# Patient Record
Sex: FEMALE | Race: WHITE | NOT HISPANIC OR LATINO | Employment: FULL TIME | URBAN - METROPOLITAN AREA
[De-identification: names, ages, dates, MRNs, and addresses within clinical notes are randomized per-mention and may not be internally consistent; named-entity substitution may affect disease eponyms.]

---

## 2022-01-08 ENCOUNTER — APPOINTMENT (OUTPATIENT)
Dept: CT IMAGING | Facility: CLINIC | Age: 52
End: 2022-01-08
Attending: EMERGENCY MEDICINE
Payer: COMMERCIAL

## 2022-01-08 ENCOUNTER — HOSPITAL ENCOUNTER (EMERGENCY)
Facility: CLINIC | Age: 52
Discharge: HOME | End: 2022-01-08
Attending: EMERGENCY MEDICINE | Admitting: EMERGENCY MEDICINE
Payer: COMMERCIAL

## 2022-01-08 ENCOUNTER — APPOINTMENT (OUTPATIENT)
Dept: GENERAL RADIOLOGY | Facility: CLINIC | Age: 52
End: 2022-01-08
Attending: EMERGENCY MEDICINE
Payer: COMMERCIAL

## 2022-01-08 VITALS
RESPIRATION RATE: 20 BRPM | TEMPERATURE: 98 F | DIASTOLIC BLOOD PRESSURE: 77 MMHG | HEART RATE: 81 BPM | OXYGEN SATURATION: 96 % | SYSTOLIC BLOOD PRESSURE: 134 MMHG

## 2022-01-08 DIAGNOSIS — S00.83XA TRAUMATIC HEMATOMA OF FOREHEAD, INITIAL ENCOUNTER: ICD-10-CM

## 2022-01-08 DIAGNOSIS — S40.011A CONTUSION OF RIGHT SHOULDER, INITIAL ENCOUNTER: ICD-10-CM

## 2022-01-08 DIAGNOSIS — S13.9XXA NECK SPRAIN, INITIAL ENCOUNTER: ICD-10-CM

## 2022-01-08 PROCEDURE — 73030 X-RAY EXAM OF SHOULDER: CPT | Mod: RT

## 2022-01-08 PROCEDURE — 96374 THER/PROPH/DIAG INJ IV PUSH: CPT

## 2022-01-08 PROCEDURE — 250N000011 HC RX IP 250 OP 636: Performed by: EMERGENCY MEDICINE

## 2022-01-08 PROCEDURE — 99285 EMERGENCY DEPT VISIT HI MDM: CPT | Mod: 25

## 2022-01-08 PROCEDURE — 258N000003 HC RX IP 258 OP 636: Performed by: EMERGENCY MEDICINE

## 2022-01-08 PROCEDURE — 72125 CT NECK SPINE W/O DYE: CPT

## 2022-01-08 PROCEDURE — 96375 TX/PRO/DX INJ NEW DRUG ADDON: CPT

## 2022-01-08 PROCEDURE — 70450 CT HEAD/BRAIN W/O DYE: CPT

## 2022-01-08 PROCEDURE — 96361 HYDRATE IV INFUSION ADD-ON: CPT

## 2022-01-08 RX ORDER — ONDANSETRON 2 MG/ML
4 INJECTION INTRAMUSCULAR; INTRAVENOUS EVERY 30 MIN PRN
Status: DISCONTINUED | OUTPATIENT
Start: 2022-01-08 | End: 2022-01-08 | Stop reason: HOSPADM

## 2022-01-08 RX ORDER — HYDROMORPHONE HYDROCHLORIDE 1 MG/ML
1 INJECTION, SOLUTION INTRAMUSCULAR; INTRAVENOUS; SUBCUTANEOUS ONCE
Status: COMPLETED | OUTPATIENT
Start: 2022-01-08 | End: 2022-01-08

## 2022-01-08 RX ADMIN — HYDROMORPHONE HYDROCHLORIDE 1 MG: 1 INJECTION, SOLUTION INTRAMUSCULAR; INTRAVENOUS; SUBCUTANEOUS at 17:43

## 2022-01-08 RX ADMIN — ONDANSETRON 4 MG: 2 INJECTION INTRAMUSCULAR; INTRAVENOUS at 17:42

## 2022-01-08 RX ADMIN — SODIUM CHLORIDE 1000 ML: 9 INJECTION, SOLUTION INTRAVENOUS at 17:41

## 2022-01-08 NOTE — ED PROVIDER NOTES
History     Chief Complaint:    Head Injury      HPI   Mayra Geneva Magallon is a 51 year old female who presents with a fall from 1 hour ago. Patient was sitting in her car with her  [place]. She states that she is have too much to drain with shots of emesis from the brunch. She opened the door to the car because she was feeling sick, and the  noted that she just tilted and fell out of the car in a sitting position. She struck the right side of her face and head and shoulder. She did not pass out. Her  states that she was awake total time. Currently she complains of severe right shoulder neck and head pain. She is not on blood thinners. She denies any other medical issues. No other injuries to the chest abdomen or pelvis.    Review of Systems  Please see HPI. All other systems reviewed and negative.      Allergies:    No Known Allergies    Medications:    None  Past Medical History:    HTN     Past Surgical History:    Tubal ligation  Tonsillectomy    Family History:      The patient denies past family history.    Social History:    , here with   Uses alcohol  Physical Exam     Patient Vitals for the past 24 hrs:   BP Temp Temp src Pulse Resp SpO2   01/08/22 1704 (!) 150/102 98  F (36.7  C) Temporal 80 20 100 %       Physical Exam    GEN- alert, cooperative  HEENT- R forehead hematoma, PERRL, EOMI, MMM, oral pharynx without abnormalities, no dental injuries, midface stable, TM's clear bilaterally  NECK- ROM, soft, supple, diffuse midline C spine tenderness to palpation, no abrasions  RESP- CTAB, no w/r/r, chest wall nontender, no crepitus, symmetrical chest wall movement  CV- RRR, no m/r/g  ABD- soft, NT/ND, +BS  MSK- normal ROM in all extremities, pelvis stable to AP and lateral compression, no T and L spinal tenderness in the midline, 5/5 strength in all extremities. R shoulder TTP but with normal ROM  NEURO- GCS 15, speech normal, alert, 5/5 strength x 4,  sensation to light touch intact in all extremities,  strong bilaterally  SKIN- no rash, no bruising  PSYCH- normal mood, normal behavior, normal thought process    Emergency Department Course     Imaging:  XR Shoulder Right G/E 3 Views   Final Result   IMPRESSION: No acute fracture or malalignment. There is normal joint spacing.      CT Head w/o Contrast   Final Result   IMPRESSION:   1.  No acute intracranial abnormality.   2.  Large right frontal scalp contusion.      CT Cervical Spine w/o Contrast   Final Result   IMPRESSION:   1.  No evidence of fracture or posttraumatic subluxation.   2.  Multilevel degenerative change as detailed.          Emergency Department Course:    Reviewed:    I reviewed nursing notes, vitals, past history and care everywhere    Assessments:   I obtained history and examined the patient as noted above.    I rechecked the patient and explained findings      Interventions:    Medications   ondansetron (ZOFRAN) injection 4 mg (4 mg Intravenous Given 1/8/22 1742)   0.9% sodium chloride BOLUS (1,000 mLs Intravenous New Bag 1/8/22 1741)   HYDROmorphone (PF) (DILAUDID) injection 1 mg (1 mg Intravenous Given 1/8/22 1743)       Disposition:  The patient was discharged to home.    Impression & Plan      Medical Decision Making:  Patient presents today after a fall while sitting in the car.  She has a forehead contusion with a hematoma as well as right-sided neck pain and right shoulder pain.  Imaging studies were done.  They were all reassuring.  There is no signs of significant traumatic injury.  There is no signs of intracranial hemorrhage or injury.  She responded well to medication here and is now comfortable at home.  She passed a p.o. challenge.  She is advised to continue to ice and rest.  I advised her to use Tylenol ibuprofen for pain.  She is referred back to her doctor for follow-up.  Return precautions provided.    Diagnosis:    ICD-10-CM    1. Contusion of right shoulder,  initial encounter  S40.011A    2. Neck sprain, initial encounter  S13.9XXA    3. Traumatic hematoma of forehead, initial encounter  S00.83XA             Brittany Clark MD  01/08/22 2007

## 2022-01-08 NOTE — ED TRIAGE NOTES
Fall out of car door hit head - NO LOC. Large hematoma forehead. + ETOH today. Nausea. Neck pain and right arm pain.   ABC intact alert and no distress.

## 2022-01-09 NOTE — DISCHARGE INSTRUCTIONS
Ice and rest  Push fluids  Motrin and tylenol for pain  Return if severe pain, vomiting, worsening symptoms  Recheck with your doctor in 3 days

## 2022-02-12 ENCOUNTER — HEALTH MAINTENANCE LETTER (OUTPATIENT)
Age: 52
End: 2022-02-12

## 2022-10-10 ENCOUNTER — HEALTH MAINTENANCE LETTER (OUTPATIENT)
Age: 52
End: 2022-10-10

## 2023-02-18 ENCOUNTER — HEALTH MAINTENANCE LETTER (OUTPATIENT)
Age: 53
End: 2023-02-18

## 2024-03-16 ENCOUNTER — HEALTH MAINTENANCE LETTER (OUTPATIENT)
Age: 54
End: 2024-03-16